# Patient Record
Sex: MALE | Race: BLACK OR AFRICAN AMERICAN | Employment: FULL TIME | URBAN - METROPOLITAN AREA
[De-identification: names, ages, dates, MRNs, and addresses within clinical notes are randomized per-mention and may not be internally consistent; named-entity substitution may affect disease eponyms.]

---

## 2017-06-01 ENCOUNTER — OFFICE VISIT (OUTPATIENT)
Dept: FAMILY MEDICINE CLINIC | Age: 32
End: 2017-06-01

## 2017-06-01 VITALS
TEMPERATURE: 98.2 F | HEIGHT: 75 IN | RESPIRATION RATE: 18 BRPM | BODY MASS INDEX: 28.8 KG/M2 | WEIGHT: 231.6 LBS | SYSTOLIC BLOOD PRESSURE: 112 MMHG | OXYGEN SATURATION: 99 % | DIASTOLIC BLOOD PRESSURE: 78 MMHG | HEART RATE: 70 BPM

## 2017-06-01 DIAGNOSIS — L21.9 SEBORRHEIC DERMATITIS: Primary | ICD-10-CM

## 2017-06-01 RX ORDER — BISMUTH SUBSALICYLATE 262 MG
1 TABLET,CHEWABLE ORAL DAILY
COMMUNITY

## 2017-06-01 RX ORDER — GLUCOSAMINE/CHONDR SU A SOD 750-600 MG
TABLET ORAL
COMMUNITY
End: 2019-02-09

## 2017-06-01 RX ORDER — CHLORPHENIRAMINE MALEATE 4 MG
TABLET ORAL 2 TIMES DAILY
Qty: 15 G | Refills: 0 | Status: SHIPPED | OUTPATIENT
Start: 2017-06-01 | End: 2019-02-09

## 2017-06-01 NOTE — PROGRESS NOTES
1. Have you been to the ER, urgent care clinic since your last visit? Hospitalized since your last visit? No    2. Have you seen or consulted any other health care providers outside of the [address] [place] [address] since your last visit? Include any pap smears or colon screening.  No     Chief Complaint   Patient presents with    Allergic Reaction     patient has two black marks on face not sure where its from     Learning Assessment 4/3/2015   PRIMARY LEARNER Patient   HIGHEST LEVEL OF EDUCATION - PRIMARY LEARNER  SOME COLLEGE   BARRIERS PRIMARY LEARNER NONE   CO-LEARNER CAREGIVER No   PRIMARY LANGUAGE ENGLISH   LEARNER PREFERENCE PRIMARY READING     DEMONSTRATION   ANSWERED BY Katey Sharma   RELATIONSHIP SELF

## 2017-06-01 NOTE — MR AVS SNAPSHOT
Visit Information Date & Time Provider Department Dept. Phone Encounter #  
 6/1/2017 12:00 PM Maxfarideh Gresham, [address] [phone] [id number] Upcoming Health Maintenance Date Due DTaP/Tdap/Td series (1 - Tdap) 3/7/2007 INFLUENZA AGE 9 TO ADULT 8/1/2017 Allergies as of 6/1/2017  Review Complete On: 6/1/2017 By: Jennifer Gresham NP No Known Allergies Current Immunizations  Never Reviewed No immunizations on file. Not reviewed this visit You Were Diagnosed With   
  
 Codes Comments Seborrheic dermatitis    -  Primary ICD-10-CM: L21.9 ICD-9-CM: 690.10 Vitals BP Pulse Temp Resp Height(growth percentile) Weight(growth percentile) 112/78 (BP 1 Location: Left arm, BP Patient Position: Sitting) 70 98.2 °F (36.8 °C) (Oral) 18 6' 3\" (1.905 m) 231 lb 9.6 oz (105.1 kg) SpO2 BMI Smoking Status 99% 28.95 kg/m2 Former Smoker Vitals History BMI and BSA Data Body Mass Index Body Surface Area  
 28.95 kg/m 2 2.36 m 2 Preferred Pharmacy Pharmacy Name Phone [id number] [place], [address] [place] [address] [phone] Your Updated Medication List  
  
   
This list is accurate as of: 6/1/17 12:19 PM.  Always use your most recent med list. ALLEGRA-D 24 HOUR 180-240 mg per tablet Generic drug:  fexofenadine-pseudoephedrine Take 1 Tab by mouth daily. Biotin 2,500 mcg Cap Take  by mouth. clotrimazole 1 % topical cream  
Commonly known as:  Hung Rashid Apply  to affected area two (2) times a day. collagenase 250 unit/gram ointment Commonly known as:  SANTYL Apply  to affected area daily. multivitamin tablet Commonly known as:  ONE A DAY Take 1 Tab by mouth daily. Prescriptions Sent to Pharmacy  Refills  
 clotrimazole (LOTRIMIN) 1 % topical cream 0  
 Sig: Apply  to affected area two (2) times a day. Class: Normal  
 [facility] [address], [address] [facility] #: [phone] Route: Topical  
  
Introducing [facility] & HEALTH SERVICES! [employer] introduces Nortal AS patient portal. Now you can access parts of your medical record, email your doctor's office, and request medication refills online. 1. In your internet browser, go to [email or web address] 2. Click on the First Time User? Click Here link in the Sign In box. You will see the New Member Sign Up page. 3. Enter your Nortal AS Access Code exactly as it appears below. You will not need to use this code after youve completed the sign-up process. If you do not sign up before the expiration date, you must request a new code. · Nortal AS Access Code: [email or web address] Expires: 8/30/2017 11:56 AM 
 
4. Enter the last four digits of your Social Security Number (xxxx) and Date of Birth (mm/dd/yyyy) as indicated and click Submit. You will be taken to the next sign-up page. 5. Create a Nortal AS ID. This will be your Nortal AS login ID and cannot be changed, so think of one that is secure and easy to remember. 6. Create a Nortal AS password. You can change your password at any time. 7. Enter your Password Reset Question and Answer. This can be used at a later time if you forget your password. 8. Enter your e-mail address. You will receive e-mail notification when new information is available in [address] 9. Click Sign Up. You can now view and download portions of your medical record. 10. Click the Download Summary menu link to download a portable copy of your medical information. If you have questions, please visit the Frequently Asked Questions section of the Nortal AS website. Remember, Nortal AS is NOT to be used for urgent needs. For medical emergencies, dial 911. Now available from your iPhone and Android! Please provide this summary of care documentation to your next provider. Your primary care clinician is listed as Nesha WATKINS. If you have any questions after today's visit, please call [phone].

## 2017-06-01 NOTE — PROGRESS NOTES
HISTORY OF PRESENT ILLNESS  Dylan Alexandra is a 33 y.o. male. HPI  C/o scaly hyperpigmented area on bilateral nasolabial folds. Sx present on and off x 4 years. Has seen derm in the past, prescribed a variety of topicals and cosmetic procedures with only temporary results. Uses dove for sensitive skin. No recent change in hygiene products. Past medical history, social history, family history and medications were reviewed and updated. Visit Vitals    /78 (BP 1 Location: Left arm, BP Patient Position: Sitting)    Pulse 70    Temp 98.2 °F (36.8 °C) (Oral)    Resp 18    Ht 6' 3\" (1.905 m)    Wt 231 lb 9.6 oz (105.1 kg)    SpO2 99%    BMI 28.95 kg/m2     Review of Systems   Constitutional: Negative. Skin: Positive for rash. Negative for itching. All other systems reviewed and are negative. Physical Exam   Constitutional: No distress. Neck: Neck supple. Cardiovascular: Normal rate, regular rhythm and normal heart sounds. Pulmonary/Chest: Effort normal and breath sounds normal.   Lymphadenopathy:     He has no cervical adenopathy. Skin:   Hyperpigmented linear patches on nasolabial folds extending to mouth. Mild scaling, no erythema. ASSESSMENT and PLAN  Caroline Baker was seen today for allergic reaction. Diagnoses and all orders for this visit:    Seborrheic dermatitis  -     clotrimazole (LOTRIMIN) 1 % topical cream; Apply  to affected area two (2) times a day. Possible seborrheic dermatitis. Trial lotrimin as directed. Follow up with derm [facility] with above.

## 2017-08-30 ENCOUNTER — OFFICE VISIT (OUTPATIENT)
Dept: FAMILY MEDICINE CLINIC | Age: 32
End: 2017-08-30

## 2017-08-30 VITALS
TEMPERATURE: 98 F | BODY MASS INDEX: 28.47 KG/M2 | OXYGEN SATURATION: 98 % | WEIGHT: 229 LBS | HEIGHT: 75 IN | HEART RATE: 60 BPM | DIASTOLIC BLOOD PRESSURE: 89 MMHG | RESPIRATION RATE: 18 BRPM | SYSTOLIC BLOOD PRESSURE: 123 MMHG

## 2017-08-30 DIAGNOSIS — L65.9 HAIR LOSS: Primary | ICD-10-CM

## 2017-08-30 DIAGNOSIS — B49 FUNGAL INFECTION: ICD-10-CM

## 2017-08-30 RX ORDER — EMOLLIENT COMBINATION NO.53
CREAM (GRAM) TOPICAL
COMMUNITY
Start: 2017-06-23 | End: 2019-02-09

## 2017-08-30 RX ORDER — TERBINAFINE HYDROCHLORIDE 250 MG/1
250 TABLET ORAL DAILY
Qty: 7 TAB | Refills: 0 | Status: SHIPPED | OUTPATIENT
Start: 2017-08-30 | End: 2019-02-09

## 2017-08-30 NOTE — PROGRESS NOTES
HISTORY OF PRESENT ILLNESS  Guru Duke is a 33 y.o. male. HPI: Patient reports his hair is thinning in spite of taking biotin. Denies dry skin, abnormal weight gain, or fatigue. He sees dermatologist Dr Placido Norton. He also reports he gets fungal infection in his penis from his girl friend. Last time took Lamisil and his infection resolved, requesting more pills today. Past Surgical History:   Procedure Laterality Date    HX CYST INCISION AND DRAINAGE Left 2008    spider bite, drained and packed    HX TONSILLECTOMY Bilateral 2010   No Known Allergies    Current Outpatient Prescriptions:     HYLATOPICPLUS crea, , Disp: , Rfl:     terbinafine HCl (LAMISIL) 250 mg tablet, Take 1 Tab by mouth daily. , Disp: 7 Tab, Rfl: 0    Biotin 2,500 mcg cap, Take  by mouth., Disp: , Rfl:     multivitamin (ONE A DAY) tablet, Take 1 Tab by mouth daily. , Disp: , Rfl:     clotrimazole (LOTRIMIN) 1 % topical cream, Apply  to affected area two (2) times a day., Disp: 15 g, Rfl: 0    fexofenadine-pseudoephedrine (ALLEGRA-D 24 HOUR) 180-240 mg per tablet, Take 1 Tab by mouth daily. , Disp: , Rfl:     collagenase (SANTYL) 250 unit/g ointment, Apply  to affected area daily. , Disp: 15 g, Rfl: 0  Review of Systems   Constitutional: Negative. Respiratory: Negative. Cardiovascular: Negative. Gastrointestinal: Negative. Blood pressure 123/89, pulse 60, temperature 98 °F (36.7 °C), temperature source Oral, resp. rate 18, height 6' 3\" (1.905 m), weight 229 lb (103.9 kg), SpO2 98 %. Physical Exam   Constitutional: No distress. HENT:   Mouth/Throat: Oropharynx is clear and moist. No oropharyngeal exudate. Neck: Normal range of motion. Neck supple. Cardiovascular: Normal rate and regular rhythm. No murmur heard. Pulmonary/Chest: Effort normal and breath sounds normal.   Abdominal: Soft. Bowel sounds are normal.   Skin:   Scalp hair thinning   Nursing note and vitals reviewed.       ASSESSMENT and PLAN    ICD-10-CM ICD-9-CM    1. Hair loss L65.9 704.00 TSH 3RD GENERATION      CBC W/O DIFF      METABOLIC PANEL, COMPREHENSIVE   2.  Fungal infection B49 117.9 terbinafine HCl (LAMISIL) 250 mg tablet   await labs  Pt was given an after visit summary which includes diagnosis, current medicines and vital and voiced understanding of treatment plan

## 2017-08-30 NOTE — PROGRESS NOTES
1. Have you been to the ER, urgent care clinic since your last visit? Hospitalized since your last visit? No    2. Have you seen or consulted any other health care providers outside of the [address] [place] [address] since your last visit? Include any pap smears or colon screening. No     Chief Complaint   Patient presents with    Hair/Scalp Problem     patient here to discuss hair regrowth     Learning assessment complete  Abuse Screening Questionnaire 8/30/2017   Do you ever feel afraid of your partner? N   Are you in a relationship with someone who physically or mentally threatens you? N   Is it safe for you to go home? Y     Fall Risk Assessment, last 12 mths 8/30/2017   Able to walk? Yes   Fall in past 12 months?  No

## 2017-08-31 LAB
ALBUMIN SERPL-MCNC: 4.5 G/DL (ref 3.5–5.5)
ALBUMIN/GLOB SERPL: 1.6 {RATIO} (ref 1.2–2.2)
ALP SERPL-CCNC: 68 IU/L (ref 39–117)
ALT SERPL-CCNC: 24 IU/L (ref 0–44)
AST SERPL-CCNC: 21 IU/L (ref 0–40)
BILIRUB SERPL-MCNC: 1.1 MG/DL (ref 0–1.2)
BUN SERPL-MCNC: 9 MG/DL (ref 6–20)
BUN/CREAT SERPL: 10 (ref 9–20)
CALCIUM SERPL-MCNC: 9.2 MG/DL (ref 8.7–10.2)
CHLORIDE SERPL-SCNC: 97 MMOL/L (ref 96–106)
CO2 SERPL-SCNC: 26 MMOL/L (ref 18–29)
CREAT SERPL-MCNC: 0.91 MG/DL (ref 0.76–1.27)
ERYTHROCYTE [DISTWIDTH] IN BLOOD BY AUTOMATED COUNT: 14.6 % (ref 12.3–15.4)
GLOBULIN SER CALC-MCNC: 2.9 G/DL (ref 1.5–4.5)
GLUCOSE SERPL-MCNC: 87 MG/DL (ref 65–99)
HCT VFR BLD AUTO: 43 % (ref 37.5–51)
HGB BLD-MCNC: 14.8 G/DL (ref 12.6–17.7)
MCH RBC QN AUTO: 29.7 PG (ref 26.6–33)
MCHC RBC AUTO-ENTMCNC: 34.4 G/DL (ref 31.5–35.7)
MCV RBC AUTO: 86 FL (ref 79–97)
PLATELET # BLD AUTO: 264 X10E3/UL (ref 150–379)
POTASSIUM SERPL-SCNC: 3.9 MMOL/L (ref 3.5–5.2)
PROT SERPL-MCNC: 7.4 G/DL (ref 6–8.5)
RBC # BLD AUTO: 4.98 X10E6/UL (ref 4.14–5.8)
SODIUM SERPL-SCNC: 140 MMOL/L (ref 134–144)
TSH SERPL DL<=0.005 MIU/L-ACNC: 2.9 UIU/ML (ref 0.45–4.5)
WBC # BLD AUTO: 4.5 X10E3/UL (ref 3.4–10.8)

## 2018-04-10 ENCOUNTER — OFFICE VISIT (OUTPATIENT)
Dept: FAMILY MEDICINE CLINIC | Age: 33
End: 2018-04-10

## 2018-04-10 VITALS
HEIGHT: 75 IN | DIASTOLIC BLOOD PRESSURE: 80 MMHG | TEMPERATURE: 98.2 F | OXYGEN SATURATION: 98 % | SYSTOLIC BLOOD PRESSURE: 110 MMHG | RESPIRATION RATE: 16 BRPM | BODY MASS INDEX: 29.12 KG/M2 | WEIGHT: 234.2 LBS | HEART RATE: 64 BPM

## 2018-04-10 DIAGNOSIS — E66.3 OVER WEIGHT: ICD-10-CM

## 2018-04-10 DIAGNOSIS — Z00.00 ROUTINE GENERAL MEDICAL EXAMINATION AT A HEALTH CARE FACILITY: Primary | ICD-10-CM

## 2018-04-10 DIAGNOSIS — B49 FUNGAL INFECTION: ICD-10-CM

## 2018-04-10 RX ORDER — NYSTATIN 100000 [USP'U]/ML
SUSPENSION ORAL
Qty: 200 ML | Refills: 0 | Status: SHIPPED | OUTPATIENT
Start: 2018-04-10 | End: 2019-02-09

## 2018-04-10 NOTE — PATIENT INSTRUCTIONS
Body Mass Index: Care Instructions  Your Care Instructions    Body mass index (BMI) can help you see if your weight is raising your risk for health problems. It uses a formula to compare how much you weigh with how tall you are. · A BMI lower than 18.5 is considered underweight. · A BMI between 18.5 and 24.9 is considered healthy. · A BMI between 25 and 29.9 is considered overweight. A BMI of 30 or higher is considered obese. If your BMI is in the normal range, it means that you have a lower risk for weight-related health problems. If your BMI is in the overweight or obese range, you may be at increased risk for weight-related health problems, such as high blood pressure, heart disease, stroke, arthritis or joint pain, and diabetes. If your BMI is in the underweight range, you may be at increased risk for health problems such as fatigue, lower protection (immunity) against illness, muscle loss, bone loss, hair loss, and hormone problems. BMI is just one measure of your risk for weight-related health problems. You may be at higher risk for health problems if you are not active, you eat an unhealthy diet, or you drink too much alcohol or use tobacco products. Follow-up care is a key part of your treatment and safety. Be sure to make and go to all appointments, and call your doctor if you are having problems. It's also a good idea to know your test results and keep a list of the medicines you take. How can you care for yourself at home? · Practice healthy eating habits. This includes eating plenty of fruits, vegetables, whole grains, lean protein, and low-fat dairy. · If your doctor recommends it, get more exercise. Walking is a good choice. Bit by bit, increase the amount you walk every day. Try for at least 30 minutes on most days of the week. · Do not smoke. Smoking can increase your risk for health problems. If you need help quitting, talk to your doctor about stop-smoking programs and medicines. These can increase your chances of quitting for good. · Limit alcohol to 2 drinks a day for men and 1 drink a day for women. Too much alcohol can cause health problems. If you have a BMI higher than 25  · Your doctor may do other tests to check your risk for weight-related health problems. This may include measuring the distance around your waist. A waist measurement of more than 40 inches in men or 35 inches in women can increase the risk of weight-related health problems. · Talk with your doctor about steps you can take to stay healthy or improve your health. You may need to make lifestyle changes to lose weight and stay healthy, such as changing your diet and getting regular exercise. If you have a BMI lower than 18.5  · Your doctor may do other tests to check your risk for health problems. · Talk with your doctor about steps you can take to stay healthy or improve your health. You may need to make lifestyle changes to gain or maintain weight and stay healthy, such as getting more healthy foods in your diet and doing exercises to build muscle. Where can you learn more? Go to http://marcial-frankie.info/. Enter S176 in the search box to learn more about \"Body Mass Index: Care Instructions. \"  Current as of: October 13, 2016  Content Version: 11.4  © 5299-4162 Healthwise, Incorporated. Care instructions adapted under license by [employer] (which disclaims liability or warranty for this information). If you have questions about a medical condition or this instruction, always ask your healthcare professional. [address] any warranty or liability for your use of this information.

## 2018-04-10 NOTE — MR AVS SNAPSHOT
[address] 
[phone] Patient: Carlota Tobar MRN: [id number] :1985 Visit Information Date & Time Provider Department Dept. Phone Encounter #  
 4/10/2018 11:45 AM Taylor Aceves, [address] [phone] [id number] Upcoming Health Maintenance Date Due DTaP/Tdap/Td series (1 - Tdap) 3/7/2007 Influenza Age 5 to Adult 2017 Allergies as of 4/10/2018  Review Complete On: 4/10/2018 By: Malcolm Howard LPN No Known Allergies Current Immunizations  Never Reviewed No immunizations on file. Not reviewed this visit You Were Diagnosed With   
  
 Codes Comments Routine general medical examination at a health care facility    -  Primary ICD-10-CM: Z00.00 ICD-9-CM: V70.0 Fungal infection     ICD-10-CM: B49 ICD-9-CM: 117.9 Vitals BP Pulse Temp Resp Height(growth percentile) Weight(growth percentile) 110/80 (BP 1 Location: Left arm, BP Patient Position: Sitting) 64 98.2 °F (36.8 °C) (Oral) 16 6' 3\" (1.905 m) 234 lb 3.2 oz (106.2 kg) SpO2 BMI Smoking Status 98% 29.27 kg/m2 Former Smoker Vitals History BMI and BSA Data Body Mass Index Body Surface Area  
 29.27 kg/m 2 2.37 m 2 Preferred Pharmacy Pharmacy Name Phone [id number] Rainelle Kehr, [address] [place] [address] [phone] Your Updated Medication List  
  
   
This list is accurate as of 4/10/18 12:01 PM.  Always use your most recent med list. ALLEGRA-D 24 HOUR 180-240 mg per tablet Generic drug:  fexofenadine-pseudoephedrine Take 1 Tab by mouth daily. Biotin 2,500 mcg Cap Take  by mouth. clotrimazole 1 % topical cream  
Commonly known as:  Pro Garber Apply  to affected area two (2) times a day. collagenase 250 unit/gram ointment Commonly known as:  SANTYL Apply  to affected area daily. HYLATOPICPLUS Crea Generic drug:  emollient combination no. 53  
  
 multivitamin tablet Commonly known as:  ONE A DAY Take 1 Tab by mouth daily. nystatin 100,000 unit/mL suspension Commonly known as:  MYCOSTATIN  
swish and spit qid  
  
 terbinafine HCl 250 mg tablet Commonly known as:  LAMISIL Take 1 Tab by mouth daily. Prescriptions Sent to Pharmacy Refills  
 nystatin (MYCOSTATIN) 100,000 unit/mL suspension 0 Sig: swish and spit qid Class: Normal  
 Pharmacy: [facility] [address] [facility], [address] [place] [address] Ph #: [phone] We Performed the Following CBC W/O DIFF [35841 CPT(R)] HEMOGLOBIN A1C WITH EAG [47224 CPT(R)] LIPID PANEL [69711 CPT(R)] METABOLIC PANEL, COMPREHENSIVE [58036 CPT(R)] TSH 3RD GENERATION [37201 CPT(R)] VITAMIN D, 25 HYDROXY [id number] CPT(R)] Patient Instructions Body Mass Index: Care Instructions Your Care Instructions Body mass index (BMI) can help you see if your weight is raising your risk for health problems. It uses a formula to compare how much you weigh with how tall you are. · A BMI lower than 18.5 is considered underweight. · A BMI between 18.5 and 24.9 is considered healthy. · A BMI between 25 and 29.9 is considered overweight. A BMI of 30 or higher is considered obese. If your BMI is in the normal range, it means that you have a lower risk for weight-related health problems. If your BMI is in the overweight or obese range, you may be at increased risk for weight-related health problems, such as high blood pressure, heart disease, stroke, arthritis or joint pain, and diabetes. If your BMI is in the underweight range, you may be at increased risk for health problems such as fatigue, lower protection (immunity) against illness, muscle loss, bone loss, hair loss, and hormone problems. BMI is just one measure of your risk for weight-related health problems. You may be at higher risk for health problems if you are not active, you eat an unhealthy diet, or you drink too much alcohol or use tobacco products. Follow-up care is a key part of your treatment and safety. Be sure to make and go to all appointments, and call your doctor if you are having problems. It's also a good idea to know your test results and keep a list of the medicines you take. How can you care for yourself at home? · Practice healthy eating habits. This includes eating plenty of fruits, vegetables, whole grains, lean protein, and low-fat dairy. · If your doctor recommends it, get more exercise. Walking is a good choice. Bit by bit, increase the amount you walk every day. Try for at least 30 minutes on most days of the week. · Do not smoke. Smoking can increase your risk for health problems. If you need help quitting, talk to your doctor about stop-smoking programs and medicines. These can increase your chances of quitting for good. · Limit alcohol to 2 drinks a day for men and 1 drink a day for women. Too much alcohol can cause health problems. If you have a BMI higher than 25 · Your doctor may do other tests to check your risk for weight-related health problems. This may include measuring the distance around your waist. A waist measurement of more than 40 inches in men or 35 inches in women can increase the risk of weight-related health problems. · Talk with your doctor about steps you can take to stay healthy or improve your health. You may need to make lifestyle changes to lose weight and stay healthy, such as changing your diet and getting regular exercise. If you have a BMI lower than 18.5 · Your doctor may do other tests to check your risk for health problems. · Talk with your doctor about steps you can take to stay healthy or improve your health.  You may need to make lifestyle changes to gain or maintain weight and stay healthy, such as getting more healthy foods in your diet and doing exercises to build muscle. Where can you learn more? Go to http://marcial-frankie.info/. Enter S176 in the search box to learn more about \"Body Mass Index: Care Instructions. \" Current as of: October 13, 2016 Content Version: 11.4 © 7350-3770 [employer]. Care instructions adapted under license by [employer] (which disclaims liability or warranty for this information). If you have questions about a medical condition or this instruction, always ask your healthcare professional. [address] any warranty or liability for your use of this information. Introducing [facility] & HEALTH SERVICES! Mel[employer] introduces [email or web address] patient portal. Now you can access parts of your medical record, email your doctor's office, and request medication refills online. 1. In your internet browser, go to [email or web address] 2. Click on the First Time User? Click Here link in the Sign In box. You will see the New Member Sign Up page. 3. Enter your [email or web address] Access Code exactly as it appears below. You will not need to use this code after youve completed the sign-up process. If you do not sign up before the expiration date, you must request a new code. · [email or web address] Access Code: [email or web address] Expires: 7/9/2018 11:20 AM 
 
4. Enter the last four digits of your Social Security Number (xxxx) and Date of Birth (mm/dd/yyyy) as indicated and click Submit. You will be taken to the next sign-up page. 5. Create a embraaset ID. This will be your [email or web address] login ID and cannot be changed, so think of one that is secure and easy to remember. 6. Create a [email or web address] password. You can change your password at any time. 7. Enter your Password Reset Question and Answer. This can be used at a later time if you forget your password. 8. Enter your e-mail address. You will receive e-mail notification when new information is available in [address] 9. Click Sign Up. You can now view and download portions of your medical record. 10. Click the Download Summary menu link to download a portable copy of your medical information. If you have questions, please visit the Frequently Asked Questions section of the Vlingo website. Remember, Vlingo is NOT to be used for urgent needs. For medical emergencies, dial 911. Now available from your iPhone and Android! Please provide this summary of care documentation to your next provider. Your primary care clinician is listed as [facility] JUAN ANTONIOCHELSEA. If you have any questions after today's visit, please call [phone].

## 2018-04-10 NOTE — PROGRESS NOTES
Chief Complaint   Patient presents with    Mouth Laceration     Pt per noticed in both checks some white patches about 2 days ago. 1. Have you been to the ER, urgent care clinic since your last visit? Hospitalized since your last visit? No    2. Have you seen or consulted any other health care providers outside of the [facility] since your last visit? Include any pap smears or colon screening.  No

## 2018-04-10 NOTE — PROGRESS NOTES
Subjective: Arlene Mullen is a 29 y.o. male presenting for his annual checkup. ROS:  Feeling well. No dyspnea or chest pain on exertion. No abdominal pain, change in bowel habits, black or bloody stools. No urinary tract or prostatic symptoms. No neurological complaints. Specific concerns today: Complaints of white patches and dry mouth inside his mouth. Over weight, he doesn't exercise but is active during the days. He watches his diet    There are no active problems to display for this patient. Current Outpatient Prescriptions   Medication Sig Dispense Refill    nystatin (MYCOSTATIN) 100,000 unit/mL suspension swish and spit qid 200 mL 0    HYLATOPICPLUS crea       Biotin 2,500 mcg cap Take  by mouth.  multivitamin (ONE A DAY) tablet Take 1 Tab by mouth daily.  terbinafine HCl (LAMISIL) 250 mg tablet Take 1 Tab by mouth daily. 7 Tab 0    clotrimazole (LOTRIMIN) 1 % topical cream Apply  to affected area two (2) times a day. 15 g 0    fexofenadine-pseudoephedrine (ALLEGRA-D 24 HOUR) 180-240 mg per tablet Take 1 Tab by mouth daily.  collagenase (SANTYL) 250 unit/g ointment Apply  to affected area daily. 15 g 0     No Known Allergies  History reviewed. No pertinent past medical history.   Past Surgical History:   Procedure Laterality Date    HX CYST INCISION AND DRAINAGE Left 2008    spider bite, drained and packed    HX TONSILLECTOMY Bilateral 2010     Family History   Problem Relation Age of Onset    Diabetes Mother     Asthma Mother     No Known Problems Father      Social History   Substance Use Topics    Smoking status: Former Smoker     Quit date: 10/23/2008    Smokeless tobacco: Never Used    Alcohol use Yes          Objective:     Visit Vitals    /80 (BP 1 Location: Left arm, BP Patient Position: Sitting)    Pulse 64    Temp 98.2 °F (36.8 °C) (Oral)    Resp 16    Ht 6' 3\" (1.905 m)    Wt 234 lb 3.2 oz (106.2 kg)    SpO2 98%    BMI 29.27 kg/m2     The patient appears well, alert, oriented x 3, in no distress. ENT white patches in side his mouth, otherwise normal.  Neck supple. No adenopathy or thyromegaly. RIAN. Lungs are clear, good air entry, no wheezes, rhonchi or rales. S1 and S2 normal, no murmurs, regular rate and rhythm. Abdomen is soft without tenderness, guarding, mass or organomegaly.  exam: no penile lesions or discharge, no testicular masses or tenderness, no hernias. Extremities show no edema, normal peripheral pulses. Neurological is normal without focal findings. Assessment/Plan:   healthy adult male  lose weight, increase physical activity, follow low fat diet, follow low salt diet, routine labs ordered. ICD-10-CM ICD-9-CM    1. Routine general medical examination at a health care facility Z00.00 V70.0 CBC W/O DIFF      METABOLIC PANEL, COMPREHENSIVE      LIPID PANEL      TSH 3RD GENERATION      VITAMIN D, 25 HYDROXY      HEMOGLOBIN A1C WITH EAG   2. Fungal infection B49 117.9 nystatin (MYCOSTATIN) 100,000 unit/mL suspension   3.  Over weight E66.3 278.02 Normal BMI discussed, advised to watch diet and exericse   Pt was given an after visit summary which includes diagnosis, current medicines and vital and voiced understanding of treatment plan

## 2018-04-11 ENCOUNTER — TELEPHONE (OUTPATIENT)
Dept: FAMILY MEDICINE CLINIC | Age: 33
End: 2018-04-11

## 2018-04-11 DIAGNOSIS — R17 ELEVATED BILIRUBIN: Primary | ICD-10-CM

## 2018-04-11 LAB
25(OH)D3+25(OH)D2 SERPL-MCNC: 28.1 NG/ML (ref 30–100)
ALBUMIN SERPL-MCNC: 4.7 G/DL (ref 3.5–5.5)
ALBUMIN/GLOB SERPL: 1.7 {RATIO} (ref 1.2–2.2)
ALP SERPL-CCNC: 64 IU/L (ref 39–117)
ALT SERPL-CCNC: 24 IU/L (ref 0–44)
AST SERPL-CCNC: 31 IU/L (ref 0–40)
BILIRUB SERPL-MCNC: 1.3 MG/DL (ref 0–1.2)
BUN SERPL-MCNC: 11 MG/DL (ref 6–20)
BUN/CREAT SERPL: 11 (ref 9–20)
CALCIUM SERPL-MCNC: 9.4 MG/DL (ref 8.7–10.2)
CHLORIDE SERPL-SCNC: 101 MMOL/L (ref 96–106)
CHOLEST SERPL-MCNC: 152 MG/DL (ref 100–199)
CO2 SERPL-SCNC: 29 MMOL/L (ref 18–29)
CREAT SERPL-MCNC: 1.02 MG/DL (ref 0.76–1.27)
ERYTHROCYTE [DISTWIDTH] IN BLOOD BY AUTOMATED COUNT: 14.3 % (ref 12.3–15.4)
EST. AVERAGE GLUCOSE BLD GHB EST-MCNC: 105 MG/DL
GFR SERPLBLD CREATININE-BSD FMLA CKD-EPI: 112 ML/MIN/1.73
GFR SERPLBLD CREATININE-BSD FMLA CKD-EPI: 97 ML/MIN/1.73
GLOBULIN SER CALC-MCNC: 2.8 G/DL (ref 1.5–4.5)
GLUCOSE SERPL-MCNC: 85 MG/DL (ref 65–99)
HBA1C MFR BLD: 5.3 % (ref 4.8–5.6)
HCT VFR BLD AUTO: 43.2 % (ref 37.5–51)
HDLC SERPL-MCNC: 49 MG/DL
HGB BLD-MCNC: 14.8 G/DL (ref 13–17.7)
INTERPRETATION, 910389: NORMAL
LDLC SERPL CALC-MCNC: 91 MG/DL (ref 0–99)
MCH RBC QN AUTO: 29.6 PG (ref 26.6–33)
MCHC RBC AUTO-ENTMCNC: 34.3 G/DL (ref 31.5–35.7)
MCV RBC AUTO: 86 FL (ref 79–97)
PLATELET # BLD AUTO: 263 X10E3/UL (ref 150–379)
POTASSIUM SERPL-SCNC: 4.2 MMOL/L (ref 3.5–5.2)
PROT SERPL-MCNC: 7.5 G/DL (ref 6–8.5)
RBC # BLD AUTO: 5 X10E6/UL (ref 4.14–5.8)
SODIUM SERPL-SCNC: 141 MMOL/L (ref 134–144)
TRIGL SERPL-MCNC: 59 MG/DL (ref 0–149)
TSH SERPL DL<=0.005 MIU/L-ACNC: 2.32 UIU/ML (ref 0.45–4.5)
VLDLC SERPL CALC-MCNC: 12 MG/DL (ref 5–40)
WBC # BLD AUTO: 4.7 X10E3/UL (ref 3.4–10.8)

## 2018-04-11 NOTE — TELEPHONE ENCOUNTER
Pt has been made aware of his Bilirubin levels being elevated, and to follow up for recheck at the lab(only) . He voiced understanding and a letter has also been mailed to pt.

## 2018-04-21 LAB
BILIRUB DIRECT SERPL-MCNC: 0.32 MG/DL (ref 0–0.4)
BILIRUB INDIRECT SERPL-MCNC: 1.18 MG/DL (ref 0.1–0.8)
BILIRUB SERPL-MCNC: 1.5 MG/DL (ref 0–1.2)

## 2018-11-07 ENCOUNTER — OFFICE VISIT (OUTPATIENT)
Dept: FAMILY MEDICINE CLINIC | Age: 33
End: 2018-11-07

## 2018-11-07 VITALS
HEIGHT: 75 IN | HEART RATE: 77 BPM | WEIGHT: 231.4 LBS | DIASTOLIC BLOOD PRESSURE: 69 MMHG | TEMPERATURE: 98 F | OXYGEN SATURATION: 98 % | SYSTOLIC BLOOD PRESSURE: 115 MMHG | BODY MASS INDEX: 28.77 KG/M2

## 2018-11-07 DIAGNOSIS — N50.82 SCROTAL PAIN: Primary | ICD-10-CM

## 2018-11-07 DIAGNOSIS — R35.0 URINARY FREQUENCY: ICD-10-CM

## 2018-11-07 RX ORDER — CIPROFLOXACIN 500 MG/1
500 TABLET ORAL 2 TIMES DAILY
Qty: 20 TAB | Refills: 0 | Status: SHIPPED | OUTPATIENT
Start: 2018-11-07 | End: 2018-11-17

## 2018-11-07 NOTE — LETTER
11/12/2018 7:17 AM 
 
 [facility] 
[address]uno [address] [id number] Dear [facility]: Please find your most recent results below. Resulted Orders CHLAMYDIA/GC PCR Result Value Ref Range Chlamydia trachomatis, SHAKIR Negative Negative Neisseria gonorrhoeae, SHAKIR Negative Negative Narrative Performed at:  [address]  [id number] : Ja Brown MD, Phone:  [phone] GC/chlamydia is negative RECOMMENDATIONS: 
Please take medication, let me know if you still have pain Please call me if you have any questions: [phone] Sincerely, Alejandra Martinez NP

## 2018-11-07 NOTE — PROGRESS NOTES
Chief Complaint Patient presents with  Groin Pain  
  over week scrotum 6-8 out of 10  
 
1. Have you been to the ER, urgent care clinic since your last visit? Hospitalized since your last visit? No 
 
2. Have you seen or consulted any other health care providers outside of the [facility] since your last visit? Include any pap smears or colon screening.  No

## 2018-11-07 NOTE — PROGRESS NOTES
HISTORY OF PRESENT ILLNESS Jessica Rosado is a 29 y.o. male. HPI: Patient complaints of scrotal pain x 2 weeks. He had 6-8 out of ten in the last weeks. It is associated with urinary frequency and his semen  is thicker than normal. He reports that he has broken off with his girl friend a month ago and hasn't had any sex since then . Denies penile discharge History reviewed. No pertinent past medical history. Past Surgical History:  
Procedure Laterality Date  HX CYST INCISION AND DRAINAGE Left 2008  
 spider bite, drained and packed  HX TONSILLECTOMY Bilateral 2010 No Known Allergies Current Outpatient Medications:  
  ciprofloxacin HCl (CIPRO) 500 mg tablet, Take 1 Tab by mouth two (2) times a day for 10 days. , Disp: 20 Tab, Rfl: 0 
  HYLATOPICPLUS crea, , Disp: , Rfl:   Biotin 2,500 mcg cap, Take  by mouth., Disp: , Rfl:  
  multivitamin (ONE A DAY) tablet, Take 1 Tab by mouth daily. , Disp: , Rfl:  
  fexofenadine-pseudoephedrine (ALLEGRA-D 24 HOUR) 180-240 mg per tablet, Take 1 Tab by mouth daily. , Disp: , Rfl:  
  nystatin (MYCOSTATIN) 100,000 unit/mL suspension, swish and spit qid, Disp: 200 mL, Rfl: 0 
  terbinafine HCl (LAMISIL) 250 mg tablet, Take 1 Tab by mouth daily. , Disp: 7 Tab, Rfl: 0 
  clotrimazole (LOTRIMIN) 1 % topical cream, Apply  to affected area two (2) times a day., Disp: 15 g, Rfl: 0 
  collagenase (SANTYL) 250 unit/g ointment, Apply  to affected area daily. , Disp: 15 g, Rfl: 0 Review of Systems Constitutional: Negative. Eyes: Negative. Respiratory: Negative. Cardiovascular: Negative. Gastrointestinal: Negative. Genitourinary: Positive for frequency. Blood pressure 115/69, pulse 77, temperature 98 °F (36.7 °C), temperature source Oral, height 6' 3\" (1.905 m), weight 231 lb 6.4 oz (105 kg), SpO2 98 %. Physical Exam  
Constitutional: No distress. HENT:  
Mouth/Throat: Oropharynx is clear and moist.  
Neck: Neck supple. Cardiovascular: Normal rate and regular rhythm. Pulmonary/Chest: Effort normal and breath sounds normal.  
Abdominal: Bowel sounds are normal.  
Genitourinary: No penile tenderness. Genitourinary Comments: No lump noted in scrotum No penile discharge UA is clear for infection Nursing note and vitals reviewed. ASSESSMENT and PLAN Diagnoses and all orders for this visit: 1. Scrotal pain 
-     URINALYSIS W/ RFLX MICROSCOPIC 
-     US SCROTUM/TESTICLES; Future 
-     ciprofloxacin HCl (CIPRO) 500 mg tablet; Take 1 Tab by mouth two (2) times a day for 10 days. -     CHLAMYDIA/GC AMPLIFICATON THINPREP 2. Urinary frequency Pt was given an after visit summary which includes diagnosis, current medicines and vital and voiced understanding of treatment plan

## 2018-11-09 ENCOUNTER — TELEPHONE (OUTPATIENT)
Dept: FAMILY MEDICINE CLINIC | Age: 33
End: 2018-11-09

## 2018-11-09 NOTE — TELEPHONE ENCOUNTER
Pt is requesting a call back from office in regards to lab results. Pt states that a voice mail maybe left if no answer.      LOV Wednesday, November 07, 2018 10:30 AM    Best call back [phone]

## 2018-11-11 LAB
C TRACH RRNA SPEC QL NAA+PROBE: NEGATIVE
N GONORRHOEA RRNA SPEC QL NAA+PROBE: NEGATIVE

## 2018-11-19 ENCOUNTER — HOSPITAL ENCOUNTER (OUTPATIENT)
Dept: ULTRASOUND IMAGING | Age: 33
Discharge: HOME | End: 2018-11-19
Payer: COMMERCIAL

## 2018-11-19 DIAGNOSIS — N50.82 SCROTAL PAIN: ICD-10-CM

## 2018-11-19 PROCEDURE — 76870 US EXAM SCROTUM: CPT

## 2018-11-20 ENCOUNTER — TELEPHONE (OUTPATIENT)
Dept: FAMILY MEDICINE CLINIC | Age: 33
End: 2018-11-20

## 2018-11-20 DIAGNOSIS — I86.1 LEFT VARICOCELE: Primary | ICD-10-CM

## 2019-02-09 ENCOUNTER — OFFICE VISIT (OUTPATIENT)
Dept: URGENT CARE | Age: 34
End: 2019-02-09

## 2019-02-09 VITALS
BODY MASS INDEX: 29.72 KG/M2 | DIASTOLIC BLOOD PRESSURE: 92 MMHG | SYSTOLIC BLOOD PRESSURE: 139 MMHG | OXYGEN SATURATION: 100 % | RESPIRATION RATE: 16 BRPM | HEART RATE: 62 BPM | HEIGHT: 75 IN | TEMPERATURE: 98.6 F | WEIGHT: 239 LBS

## 2019-02-09 DIAGNOSIS — H01.001 BLEPHARITIS OF RIGHT UPPER EYELID, UNSPECIFIED TYPE: Primary | ICD-10-CM

## 2019-02-09 RX ORDER — ERYTHROMYCIN 5 MG/G
1 OINTMENT OPHTHALMIC
Qty: 10 TUBE | Refills: 0 | Status: SHIPPED | OUTPATIENT
Start: 2019-02-09 | End: 2019-02-19

## 2019-02-09 RX ORDER — ERYTHROMYCIN 5 MG/G
1 OINTMENT OPHTHALMIC
Qty: 10 TUBE | Refills: 0 | Status: SHIPPED | OUTPATIENT
Start: 2019-02-09 | End: 2019-02-09 | Stop reason: SDUPTHER

## 2019-02-10 NOTE — PROGRESS NOTES
Patient with eye irritation for 5 days and with lid swelling in the right upper lid starting yesterday. No foreign bodies or trauma. Exposure to mendoza air at work. The history is provided by the patient. Eye Pain   This is a new problem. The current episode started more than 2 days ago. The problem has been gradually worsening. Nothing relieves the symptoms. Treatments tried: Visine allergy drops, oral allergy medications. The treatment provided mild relief. History reviewed. No pertinent past medical history. Past Surgical History:   Procedure Laterality Date    HX CYST INCISION AND DRAINAGE Left 2008    spider bite, drained and packed    HX TONSILLECTOMY Bilateral 2010         Family History   Problem Relation Age of Onset    Diabetes Mother     Asthma Mother     No Known Problems Father         Social History     Socioeconomic History    Marital status: SINGLE     Spouse name: Not on file    Number of children: Not on file    Years of education: Not on file    Highest education level: Not on file   Social Needs    Financial resource strain: Not on file    Food insecurity - worry: Not on file    Food insecurity - inability: Not on file   [place] needs - medical: Not on file   [place] needs - non-medical: Not on file   Occupational History    Not on file   Tobacco Use    Smoking status: Former Smoker     Last attempt to quit: 10/23/2008     Years since quitting: 10.3    Smokeless tobacco: Never Used   Substance and Sexual Activity    Alcohol use: Yes    Drug use: No    Sexual activity: Yes   Other Topics Concern    Not on file   Social History Narrative    Not on file                ALLERGIES: Patient has no known allergies. Review of Systems   Eyes: Positive for photophobia. Negative for discharge, itching and visual disturbance.         Eye irritation, lid swelling        Vitals:    02/09/19 1858   BP: (!) 139/92   Pulse: 62   Resp: 16   Temp: 98.6 °F (37 °C)   SpO2: 100%   Weight: 239 lb (108.4 kg)   Height: 6' 3\" (1.905 m)       Physical Exam   Constitutional: Vital signs are normal. He appears well-developed and well-nourished. No distress. Eyes: Conjunctivae and EOM are normal. Pupils are equal, round, and reactive to light. Right eye exhibits no discharge and no exudate. Left eye exhibits no discharge and no exudate. MDM    ICD-10-CM ICD-9-CM    1. Blepharitis of right upper eyelid, unspecified type H01.001 373.00 erythromycin (ILOTYCIN) ophthalmic ointment     Medications Ordered Today   Medications    DISCONTD: erythromycin (ILOTYCIN) ophthalmic ointment     Sig: Administer 1 g to right eye nightly for 10 days. Apply thin ribbon to the lower eyelid before bed. Dispense:  10 Tube     Refill:  0    erythromycin (ILOTYCIN) ophthalmic ointment     Sig: Administer 1 g to right eye nightly for 10 days. Apply thin ribbon to the lower eyelid before bed. Dispense:  10 Tube     Refill:  0     No results found for any visits on 02/09/19. The patients condition was discussed with the patient and they understand. The patient is to follow up with primary care doctor. If signs and symptoms become worse the pt is to go to the ER. The patient is to take medications as prescribed.      Procedures

## 2019-02-10 NOTE — PATIENT INSTRUCTIONS
Blepharitis: Care Instructions  Your Care Instructions    Blepharitis is an inflammation or infection of the eyelids. It causes dry, scaly crusts on the eyelids. It can also cause your eyes to itch, burn, and look red. This problem is more common in people who have rosacea, dandruff, skin allergies, or eczema. Home treatment can help you keep your eyes comfortable. Your doctor may also prescribe an ointment to put on your eyelids. Follow-up care is a key part of your treatment and safety. Be sure to make and go to all appointments, and call your doctor if you are having problems. It's also a good idea to know your test results and keep a list of the medicines you take. How can you care for yourself at home? · Wash your eyelids and eyebrows daily with baby shampoo. To wash your eyelids:  ? Place a very warm washcloth over your eyes for about a minute. This will help soften and loosen the crusts on your eyelashes. ? Put a few drops of baby shampoo on a warm washcloth. ? Gently wipe your eyelids. This helps remove any crust. It also cleans your eyelids. ? Rinse well with water. · Be safe with medicines. If your doctor prescribed medicine for you, use it exactly as directed. Call your doctor if you think you are having a problem with your medicine. When should you call for help? Call your doctor now or seek immediate medical care if:    · You have signs of an eye infection, such as:  ? Pus or thick discharge coming from the eye.  ? Redness or swelling around the eye.  ? A fever.    Watch closely for changes in your health, and be sure to contact your doctor if:    · You have vision changes.     · You do not get better as expected. Where can you learn more? Go to http://marcial-frankie.info/. Enter X199 in the search box to learn more about \"Blepharitis: Care Instructions. \"  Current as of: July 17, 2018  Content Version: 11.9  © 8836-8800 [employer].  Care instructions adapted under license by [employer] (which disclaims liability or warranty for this information). If you have questions about a medical condition or this instruction, always ask your healthcare professional. [address] any warranty or liability for your use of this information.

## 2019-02-11 ENCOUNTER — OFFICE VISIT (OUTPATIENT)
Dept: FAMILY MEDICINE CLINIC | Age: 34
End: 2019-02-11

## 2019-02-11 VITALS
DIASTOLIC BLOOD PRESSURE: 89 MMHG | HEIGHT: 75 IN | WEIGHT: 238 LBS | BODY MASS INDEX: 29.59 KG/M2 | HEART RATE: 66 BPM | OXYGEN SATURATION: 99 % | SYSTOLIC BLOOD PRESSURE: 133 MMHG | TEMPERATURE: 98.5 F | RESPIRATION RATE: 16 BRPM

## 2019-02-11 DIAGNOSIS — L08.9 SOFT TISSUE INFECTION: Primary | ICD-10-CM

## 2019-02-11 DIAGNOSIS — H57.11 ACUTE RIGHT EYE PAIN: ICD-10-CM

## 2019-02-11 RX ORDER — CEPHALEXIN 500 MG/1
500 CAPSULE ORAL 4 TIMES DAILY
Qty: 40 CAP | Refills: 0 | Status: SHIPPED | OUTPATIENT
Start: 2019-02-11 | End: 2019-02-21

## 2019-02-11 NOTE — PROGRESS NOTES
HISTORY OF PRESENT ILLNESS  Marielle Mchugh is a 29 y.o. male. HPI  Follow up right eye symptoms. Seen at  2 days ago with swelling of upper eyelid, eye pain and photophobia. Also had small open lesion below eyebrow. Diagnosed with blepharitis and given erythromycin ointment. Has been using as directed. Now has noticed swelling of right side of face pre auricular region with tenderness. No fever/chills malaise. States eye pain has slightly improved. Denies any recent injury or unusual exposures. Past medical history, social history, family history and medications were reviewed and updated. Blood pressure 133/89, pulse 66, temperature 98.5 °F (36.9 °C), temperature source Oral, resp. rate 16, height 6' 3\" (1.905 m), weight 238 lb (108 kg), SpO2 99 %. Review of Systems   Constitutional: Negative for chills, fever and malaise/fatigue. HENT: Negative for ear pain, sinus pain and sore throat. Eyes: Positive for photophobia and pain (improved). Negative for blurred vision, double vision, discharge and redness. Respiratory: Negative. Cardiovascular: Negative. Neurological: Negative for dizziness and headaches. All other systems reviewed and are negative. Physical Exam   Constitutional: No distress. HENT:   Right Ear: Tympanic membrane and ear canal normal.   Left Ear: Tympanic membrane and ear canal normal.   Nose: Right sinus exhibits no maxillary sinus tenderness and no frontal sinus tenderness. Left sinus exhibits no maxillary sinus tenderness and no frontal sinus tenderness. Mouth/Throat: Oropharynx is clear and moist. No oral lesions. No dental abscesses. Eyes: EOM are normal. Pupils are equal, round, and reactive to light. Right eye exhibits no discharge. Left eye exhibits no discharge. Right conjunctiva is injected (mild). Mild swelling of upper and lower eyelids. No erythema. Open puncture type lesion below right eyebrow. No drainage noted. Mild erythema.   Right cheek with 1+ edema. Tender pre auricular lymph node on right. Mild warmth. Limited funduscopic with no acute abnormality. Cardiovascular: Normal rate, regular rhythm and normal heart sounds. Pulmonary/Chest: Effort normal and breath sounds normal.   Skin: Skin is warm and dry. ASSESSMENT and PLAN  Diagnoses and all orders for this visit:    1. Soft tissue infection  -     cephALEXin (KEFLEX) 500 mg capsule; Take 1 Cap by mouth four (4) times daily for 10 days. Possible orbital infection originating from lesion. Stop ointment. Begin keflex as directed. Medication risks and side effects were reviewed. 2. Acute right eye pain  -     REFERRAL TO OPHTHALMOLOGY  Concerning for eye pain and photophobia. Referred to [facility] for further evaluation of possible inflammatory iritis. Contact info given to patient to schedule appointment. Follow up here prn.

## 2019-02-11 NOTE — PROGRESS NOTES
Identified pt with two pt identifiers(name and ). Reviewed record in preparation for visit and have obtained necessary documentation. Chief Complaint   Patient presents with    Facial Swelling     right side of cheek/face for 1 day, painful only when touched        Health Maintenance Due   Topic    DTaP/Tdap/Td series (1 - Tdap)       Coordination of Care Questionnaire:  :   1) Have you been to an emergency room, urgent care, or hospitalized since your last visit? yes   If yes, where when, and reason for visit? [facility] for right eye swelling      2. Have seen or consulted any other health care provider since your last visit? NO  If yes, where when, and reason for visit? 3) Do you have an Advanced Directive/ Living Will in place? NO  If yes, do we have a copy on file NO  If no, would you like information YES    Patient is accompanied by self I have received verbal consent from Beryle Glad to discuss any/all medical information while they are present in the room.     Visit Vitals  /89 (BP 1 Location: Right arm, BP Patient Position: Sitting)   Pulse 66   Temp 98.5 °F (36.9 °C) (Oral)   Resp 16   Ht 6' 3\" (1.905 m)   Wt 238 lb (108 kg)   SpO2 99%   BMI 29.75 kg/m²     Mariela Armijo LPN

## 2019-07-31 ENCOUNTER — OFFICE VISIT (OUTPATIENT)
Dept: FAMILY MEDICINE CLINIC | Age: 34
End: 2019-07-31

## 2019-07-31 VITALS
DIASTOLIC BLOOD PRESSURE: 78 MMHG | RESPIRATION RATE: 16 BRPM | SYSTOLIC BLOOD PRESSURE: 115 MMHG | TEMPERATURE: 98.2 F | HEART RATE: 67 BPM | HEIGHT: 75 IN | BODY MASS INDEX: 29 KG/M2 | WEIGHT: 233.2 LBS | OXYGEN SATURATION: 98 %

## 2019-07-31 DIAGNOSIS — R36.9 PENILE DISCHARGE: ICD-10-CM

## 2019-07-31 DIAGNOSIS — Z20.2 EXPOSURE TO STD: Primary | ICD-10-CM

## 2019-07-31 RX ORDER — CIPROFLOXACIN 500 MG/1
500 TABLET ORAL 2 TIMES DAILY
Qty: 20 TAB | Refills: 0 | Status: SHIPPED | OUTPATIENT
Start: 2019-07-31 | End: 2019-08-10

## 2019-07-31 RX ORDER — GLUCOSAMINE/CHONDR SU A SOD 750-600 MG
5000 TABLET ORAL DAILY
COMMUNITY

## 2019-07-31 RX ORDER — ASCORBIC ACID 500 MG
TABLET ORAL
COMMUNITY

## 2019-07-31 NOTE — PROGRESS NOTES
HISTORY OF PRESENT ILLNESS  Kristie Jiménez is a 36 y.o. male. HPI: Patient reports he has anew partner and requesting to check for STDs. He states penile discharge. Denies pain, burning   Past Surgical History:   Procedure Laterality Date    HX CYST INCISION AND DRAINAGE Left 2008    spider bite, drained and packed    HX TONSILLECTOMY Bilateral 2010   No Known Allergies    Current Outpatient Medications:     ascorbic acid, vitamin C, (VITAMIN C) 500 mg tablet, Take  by mouth., Disp: , Rfl:     Biotin 2,500 mcg cap, Take 5,000 mg by mouth daily. , Disp: , Rfl:     ciprofloxacin HCl (CIPRO) 500 mg tablet, Take 1 Tab by mouth two (2) times a day for 10 days. , Disp: 20 Tab, Rfl: 0    ergocalciferol, vitamin D2, (VITAMIN D2 PO), Take  by mouth., Disp: , Rfl:     multivitamin (ONE A DAY) tablet, Take 1 Tab by mouth daily. , Disp: , Rfl:     Review of Systems   Constitutional: Negative. Respiratory: Negative. Cardiovascular: Negative. Gastrointestinal: Negative. Blood pressure 115/78, pulse 67, temperature 98.2 °F (36.8 °C), temperature source Oral, resp. rate 16, height 6' 3\" (1.905 m), weight 233 lb 3.2 oz (105.8 kg), SpO2 98 %. Physical Exam   Constitutional: No distress. HENT:   Mouth/Throat: Oropharynx is clear and moist.   Neck: Normal range of motion. Neck supple. Cardiovascular: Normal rate and regular rhythm. No murmur heard. Pulmonary/Chest: Effort normal and breath sounds normal.   Abdominal: Soft. Bowel sounds are normal.   Genitourinary: Penile tenderness present. Nursing note and vitals reviewed. ASSESSMENT and PLAN  Diagnoses and all orders for this visit:    1. Exposure to STD  -     RPR  -     HEPATITIS PANEL, ACUTE  -     HIV 1/2 AG/AB, 4TH GENERATION,W RFLX CONFIRM  -     HSV 1/2 AB, IGM  -     HSV TYPE 2-SPECIFIC ABS, IGG W/REFL SUPPLEMENTAL TESTING  -     HSV-1 AB, IGG GLYCOPROTEIN, G-SPECIFIC  -     CHLAMYDIA/GC PCR    2.  Penile discharge  -     ciprofloxacin HCl (CIPRO) 500 mg tablet; Take 1 Tab by mouth two (2) times a day for 10 days.   Pt was given an after visit summary which includes diagnosis, current medicines and vital and voiced understanding of treatment plan

## 2019-07-31 NOTE — PROGRESS NOTES
Chief Complaint   Patient presents with    Exposure to STD     Change sex partner. 1. Have you been to the ER, urgent care clinic since your last visit? Hospitalized since your last visit? No    2. Have you seen or consulted any other health care providers outside of the [address] [place] [address] since your last visit? Include any pap smears or colon screening.  No

## 2019-08-01 ENCOUNTER — TELEPHONE (OUTPATIENT)
Dept: FAMILY MEDICINE CLINIC | Age: 34
End: 2019-08-01

## 2019-08-01 NOTE — TELEPHONE ENCOUNTER
----- Message from Beto Wong sent at 8/1/2019  3:04 PM EDT -----  Regarding: Reid Naik/ Telephone   Contact: [phone]  Pt requesting a return call regarding lab test results from 7/31/19 visit.

## 2019-08-03 LAB
C TRACH RRNA SPEC QL NAA+PROBE: NEGATIVE
HAV IGM SERPL QL IA: NEGATIVE
HBV CORE IGM SERPL QL IA: NEGATIVE
HBV SURFACE AG SERPL QL IA: NEGATIVE
HCV AB S/CO SERPL IA: <0.1 S/CO RATIO (ref 0–0.9)
HIV 1+2 AB+HIV1 P24 AG SERPL QL IA: NON REACTIVE
HSV1 IGG SER IA-ACNC: 36.1 INDEX (ref 0–0.9)
HSV1+2 IGM SER IA-ACNC: <0.91 RATIO (ref 0–0.9)
HSV2 IGG SER IA-ACNC: <0.91 INDEX (ref 0–0.9)
N GONORRHOEA RRNA SPEC QL NAA+PROBE: NEGATIVE
RPR SER QL: NON REACTIVE

## 2019-08-07 ENCOUNTER — TELEPHONE (OUTPATIENT)
Dept: FAMILY MEDICINE CLINIC | Age: 34
End: 2019-08-07

## 2019-08-07 RX ORDER — DOXYCYCLINE 100 MG/1
100 TABLET ORAL 2 TIMES DAILY
Qty: 20 TAB | Refills: 0 | Status: SHIPPED | OUTPATIENT
Start: 2019-08-07 | End: 2019-08-17

## 2019-08-07 NOTE — TELEPHONE ENCOUNTER
Homero Pringle, HAYLEE Cruz LPN   Caller: Unspecified (Today, 10:19 AM)             Doxycycline called in

## 2019-08-07 NOTE — TELEPHONE ENCOUNTER
Patient informed of results. Still with discharge and pain on left side, had a variocele in the past and had seen urologist before.  Still on cipro but wants to know what to do next

## 2019-12-16 ENCOUNTER — HOSPITAL ENCOUNTER (EMERGENCY)
Age: 34
Discharge: HOME | End: 2019-12-16
Attending: EMERGENCY MEDICINE
Payer: COMMERCIAL

## 2019-12-16 VITALS
OXYGEN SATURATION: 97 % | SYSTOLIC BLOOD PRESSURE: 147 MMHG | HEIGHT: 75 IN | RESPIRATION RATE: 18 BRPM | HEART RATE: 70 BPM | DIASTOLIC BLOOD PRESSURE: 99 MMHG | BODY MASS INDEX: 28.86 KG/M2 | WEIGHT: 232.14 LBS | TEMPERATURE: 97.9 F

## 2019-12-16 DIAGNOSIS — J20.9 ACUTE BRONCHITIS, UNSPECIFIED ORGANISM: Primary | ICD-10-CM

## 2019-12-16 PROCEDURE — 99282 EMERGENCY DEPT VISIT SF MDM: CPT

## 2019-12-16 PROCEDURE — 74011250637 HC RX REV CODE- 250/637: Performed by: EMERGENCY MEDICINE

## 2019-12-16 PROCEDURE — 94640 AIRWAY INHALATION TREATMENT: CPT

## 2019-12-16 RX ORDER — ALBUTEROL SULFATE 90 UG/1
2 AEROSOL, METERED RESPIRATORY (INHALATION)
Status: DISCONTINUED | OUTPATIENT
Start: 2019-12-16 | End: 2019-12-16 | Stop reason: HOSPADM

## 2019-12-16 RX ORDER — LORATADINE 10 MG/1
10 TABLET ORAL DAILY
Qty: 30 TAB | Refills: 0 | Status: SHIPPED | OUTPATIENT
Start: 2019-12-16

## 2019-12-16 RX ADMIN — ALBUTEROL SULFATE 2 PUFF: 90 AEROSOL, METERED RESPIRATORY (INHALATION) at 01:38

## 2019-12-16 NOTE — ED PROVIDER NOTES
The history is provided by the patient. Cough   This is a new problem. The current episode started 2 days ago. The problem occurs constantly. The problem has been gradually worsening. The cough is productive of sputum. There has been no fever. Associated symptoms include sore throat and vomiting (twice from coughing). Pertinent negatives include no chest pain and no shortness of breath. Treatments tried: dayquil. The treatment provided no relief. He is not a smoker. His past medical history is significant for bronchitis. His past medical history does not include pneumonia, COPD or asthma. History reviewed. No pertinent past medical history. Past Surgical History:   Procedure Laterality Date    HX CYST INCISION AND DRAINAGE Left     spider bite, drained and packed    HX TONSILLECTOMY Bilateral          Family History:   Problem Relation Age of Onset    Diabetes Mother     Asthma Mother     No Known Problems Father        Social History     Socioeconomic History    Marital status: SINGLE     Spouse name: Not on file    Number of children: Not on file    Years of education: Not on file    Highest education level: Not on file   Occupational History    Not on file   Social Needs    Financial resource strain: Not on file    Food insecurity:     Worry: Not on file     Inability: Not on file    Transportation needs:     Medical: Not on file     Non-medical: Not on file   Tobacco Use    Smoking status: Former Smoker     Last attempt to quit: 10/23/2008     Years since quittin.1    Smokeless tobacco: Never Used   Substance and Sexual Activity    Alcohol use:  Yes    Drug use: No    Sexual activity: Yes   Lifestyle    Physical activity:     Days per week: Not on file     Minutes per session: Not on file    Stress: Not on file   Relationships    Social connections:     Talks on phone: Not on file     Gets together: Not on file     Attends [employer] service: Not on file     Active member of club or organization: Not on file     Attends meetings of clubs or organizations: Not on file     Relationship status: Not on file    Intimate partner violence:     Fear of current or ex partner: Not on file     Emotionally abused: Not on file     Physically abused: Not on file     Forced sexual activity: Not on file   Other Topics Concern    Not on file   Social History Narrative    Not on file         ALLERGIES: Patient has no known allergies. Review of Systems   HENT: Positive for sore throat. Respiratory: Positive for cough. Negative for shortness of breath. Cardiovascular: Negative for chest pain. Gastrointestinal: Positive for vomiting (twice from coughing). All other systems reviewed and are negative. Vitals:    12/16/19 0126 12/16/19 0128   BP:  (!) 147/99   Pulse:  70   Resp:  18   Temp:  97.9 °F (36.6 °C)   SpO2:  97%   Weight: 105.3 kg (232 lb 2.3 oz)    Height: 6' 3\" (1.905 m)             Physical Exam  Vitals signs and nursing note reviewed. Constitutional:       General: He is not in acute distress. Appearance: He is well-developed. HENT:      Head: Normocephalic and atraumatic. Eyes:      Conjunctiva/sclera: Conjunctivae normal.   Neck:      Musculoskeletal: Neck supple. Trachea: No tracheal deviation. Cardiovascular:      Rate and Rhythm: Normal rate and regular rhythm. Pulmonary:      Effort: Pulmonary effort is normal. No respiratory distress. Breath sounds: No stridor. Wheezing (faint coarse worse at bases) present. No rhonchi or rales. Abdominal:      General: There is no distension. Musculoskeletal: Normal range of motion. General: No deformity. Skin:     General: Skin is warm and dry. Neurological:      Mental Status: He is alert. Cranial Nerves: No cranial nerve deficit.    Psychiatric:         Behavior: Behavior normal.          MDM     36 y.o. male presents with coarse basilar wheezing and productive cough with no fever or other signs of pneumonia. Has had bronchitis previously and this is similar. Provided albuterol inhaler and spacer for supportive care. Will start an empiric regimen of expectorant, antitussive, and antihistamines. No indication for antibiotics currently. Plan to follow up with PCP as needed and return precautions discussed for worsening or new concerning symptoms.      Procedures

## 2019-12-16 NOTE — ED TRIAGE NOTES
Patient arriving with c/o cough x 2 days using a cough suppressant without relief. Patient denies fevers.

## 2020-02-12 ENCOUNTER — HOSPITAL ENCOUNTER (EMERGENCY)
Age: 35
Discharge: HOME | End: 2020-02-12
Attending: EMERGENCY MEDICINE
Payer: COMMERCIAL

## 2020-02-12 VITALS
WEIGHT: 235.67 LBS | HEART RATE: 62 BPM | HEIGHT: 75 IN | TEMPERATURE: 98 F | BODY MASS INDEX: 29.3 KG/M2 | RESPIRATION RATE: 16 BRPM | OXYGEN SATURATION: 97 % | DIASTOLIC BLOOD PRESSURE: 94 MMHG | SYSTOLIC BLOOD PRESSURE: 137 MMHG
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
## 2020-02-12 DIAGNOSIS — T07.XXXA ABRASIONS OF MULTIPLE SITES: ICD-10-CM

## 2020-02-12 DIAGNOSIS — T07.XXXA MULTIPLE CONTUSIONS: ICD-10-CM

## 2020-02-12 PROCEDURE — 74011250637 HC RX REV CODE- 250/637: Performed by: EMERGENCY MEDICINE

## 2020-02-12 PROCEDURE — 99283 EMERGENCY DEPT VISIT LOW MDM: CPT

## 2020-02-12 PROCEDURE — 74011000250 HC RX REV CODE- 250: Performed by: EMERGENCY MEDICINE

## 2020-02-12 RX ORDER — IBUPROFEN 400 MG/1
800 TABLET ORAL
Status: COMPLETED | OUTPATIENT
Start: 2020-02-12 | End: 2020-02-12

## 2020-02-12 RX ORDER — CYCLOBENZAPRINE HCL 10 MG
10 TABLET ORAL 2 TIMES DAILY
Qty: 20 TAB | Refills: 0 | Status: SHIPPED | OUTPATIENT
Start: 2020-02-12

## 2020-02-12 RX ORDER — IBUPROFEN 800 MG/1
800 TABLET ORAL
Qty: 30 TAB | Refills: 0 | Status: SHIPPED | OUTPATIENT
Start: 2020-02-12

## 2020-02-12 RX ORDER — BACITRACIN 500 UNIT/G
1 PACKET (EA) TOPICAL
Status: COMPLETED | OUTPATIENT
Start: 2020-02-12 | End: 2020-02-12

## 2020-02-12 RX ADMIN — IBUPROFEN 800 MG: 400 TABLET, FILM COATED ORAL at 22:59

## 2020-02-12 RX ADMIN — BACITRACIN 1 PACKET: 500 OINTMENT TOPICAL at 22:59

## 2020-02-12 NOTE — LETTER
[address] 
[facility] 
[address] [address] [phone] Work/School Note Date: 2/12/2020 To Whom It May concern: Jeovanny Kendall was seen and treated today in the emergency room by the following provider(s): 
Attending Provider: Polo Ricardo MD. Jeovanny Kendall may return to work on 02/15/2020. Sincerely, Melba Villeda MD
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
## 2022-03-03 ENCOUNTER — HOSPITAL ENCOUNTER (EMERGENCY)
Age: 37
Discharge: HOME | End: 2022-03-03
Attending: EMERGENCY MEDICINE
Payer: COMMERCIAL

## 2022-03-03 VITALS
DIASTOLIC BLOOD PRESSURE: 97 MMHG | WEIGHT: 246.03 LBS | TEMPERATURE: 98.2 F | SYSTOLIC BLOOD PRESSURE: 139 MMHG | OXYGEN SATURATION: 95 % | HEART RATE: 72 BPM | RESPIRATION RATE: 16 BRPM | BODY MASS INDEX: 30.59 KG/M2 | HEIGHT: 75 IN

## 2022-03-03 DIAGNOSIS — S61.209A AVULSION, FINGER TIP, INITIAL ENCOUNTER: Primary | ICD-10-CM

## 2022-03-03 PROCEDURE — 90715 TDAP VACCINE 7 YRS/> IM: CPT | Performed by: FAMILY MEDICINE

## 2022-03-03 PROCEDURE — 99284 EMERGENCY DEPT VISIT MOD MDM: CPT

## 2022-03-03 PROCEDURE — 74011250636 HC RX REV CODE- 250/636: Performed by: FAMILY MEDICINE

## 2022-03-03 PROCEDURE — 90471 IMMUNIZATION ADMIN: CPT

## 2022-03-03 RX ORDER — CEPHALEXIN 500 MG/1
500 CAPSULE ORAL 4 TIMES DAILY
Qty: 28 CAPSULE | Refills: 0 | Status: SHIPPED | OUTPATIENT
Start: 2022-03-03 | End: 2022-03-10

## 2022-03-03 RX ADMIN — TETANUS TOXOID, REDUCED DIPHTHERIA TOXOID AND ACELLULAR PERTUSSIS VACCINE, ADSORBED 0.5 ML: 5; 2.5; 8; 8; 2.5 SUSPENSION INTRAMUSCULAR at 12:34

## 2022-03-03 NOTE — ED NOTES
Discharge instructions reviewed with pt and copy given along with RX by this RN. Patient verbalized understanding of all education and is ambulatory from ED in no sign of distress or discomfort.

## 2022-03-03 NOTE — ED PROVIDER NOTES
Patient is a 60-year-old male with no past medical history who presents with avulsion injury to the tip of his left second finger. He reports this occurred at work approximately 1 hour ago any reports he accidentally cut himself with a . He noted that there was a lot of bleeding to the area, so he wrapped up his finger and came in for further evaluation. He reports his pain is minimal.  He has full sensation in his finger, he has full range of motion. He is unsure when his last tetanus shot was. No past medical history on file. Past Surgical History:   Procedure Laterality Date    HX CYST INCISION AND DRAINAGE Left     spider bite, drained and packed    HX TONSILLECTOMY Bilateral          Family History:   Problem Relation Age of Onset    Diabetes Mother     Asthma Mother     No Known Problems Father        Social History     Socioeconomic History    Marital status: SINGLE     Spouse name: Not on file    Number of children: Not on file    Years of education: Not on file    Highest education level: Not on file   Occupational History    Not on file   Tobacco Use    Smoking status: Former Smoker     Quit date: 10/23/2008     Years since quittin.3    Smokeless tobacco: Never Used   Substance and Sexual Activity    Alcohol use: Yes    Drug use: No    Sexual activity: Yes   Other Topics Concern    Not on file   Social History Narrative    Not on file     Social Determinants of Health     Financial Resource Strain:     Difficulty of Paying Living Expenses: Not on file   Food Insecurity:     Worried About Running Out of Food in the Last Year: Not on file    Scott of Food in the Last Year: Not on file   Transportation Needs:     Lack of Transportation (Medical): Not on file    Lack of Transportation (Non-Medical):  Not on file   Physical Activity:     Days of Exercise per Week: Not on file    Minutes of Exercise per Session: Not on file   Stress:     Feeling of Stress : Not on file   Social Connections:     Frequency of Communication with Friends and Family: Not on file    Frequency of Social Gatherings with Friends and Family: Not on file    Attends Protestant Services: Not on file    Active Member of Clubs or Organizations: Not on file    Attends Club or Organization Meetings: Not on file    Marital Status: Not on file   Intimate Partner Violence:     Fear of Current or Ex-Partner: Not on file    Emotionally Abused: Not on file    Physically Abused: Not on file    Sexually Abused: Not on file   Housing Stability:     Unable to Pay for Housing in the Last Year: Not on file    Number of [place] in the Last Year: Not on file    Unstable Housing in the Last Year: Not on file         ALLERGIES: Patient has no known allergies. Review of Systems   Constitutional: Negative for unexpected weight change. HENT: Negative for congestion. Eyes: Negative for visual disturbance. Respiratory: Negative for cough, chest tightness and shortness of breath. Cardiovascular: Negative for chest pain. Gastrointestinal: Negative for abdominal pain. Endocrine: Negative for polyuria. Genitourinary: Negative for dysuria. Musculoskeletal: Negative for back pain. Skin: Negative for color change. Allergic/Immunologic: Negative for immunocompromised state. Neurological: Negative for dizziness and headaches. Hematological: Negative for adenopathy. Psychiatric/Behavioral: Negative for agitation. Vitals:    03/03/22 1227   BP: (!) 124/93   Pulse: 72   Resp: 16   Temp: 98.2 °F (36.8 °C)   SpO2: 96%   Weight: 111.6 kg (246 lb 0.5 oz)   Height: 6' 3\" (1.905 m)            Physical Exam  Vitals and nursing note reviewed. Constitutional:       Appearance: Normal appearance. He is normal weight. Eyes:      Conjunctiva/sclera: Conjunctivae normal.      Pupils: Pupils are equal, round, and reactive to light.    Cardiovascular:      Rate and Rhythm: Normal rate.      Pulses: Normal pulses. Pulmonary:      Effort: Pulmonary effort is normal.   Abdominal:      General: Abdomen is flat. Musculoskeletal:         General: Normal range of motion. Hands:       Cervical back: Neck supple. Skin:     General: Skin is warm. Capillary Refill: Capillary refill takes less than 2 seconds. Neurological:      General: No focal deficit present. Mental Status: He is alert and oriented to person, place, and time. Mental status is at baseline. Psychiatric:         Mood and Affect: Mood normal.         Behavior: Behavior normal.          MDM  Number of Diagnoses or Management Options  Avulsion, finger tip, initial encounter  Diagnosis management comments: Patient presents with avulsion injury to finger. Will have patient apply pressure to finger. Wound bed cleaned out with sterile water. Lido with epi applied to decrease bleeding. Layer of dermabond placed. Wound wrapped with nonadherent pressure dressing. Will update patient's tetanus. Advised patient to follow up with his PCP in 1 week for wound recheck. Will place patient on oral keflex for prophylaxis. Discussed my clinical impression(s), any labs and/or radiology results with the patient. I answered any questions and addressed any concerns. Discussed the importance of following up with their primary care physician and/or specialist(s). Discussed signs or symptoms that would warrant return back to the ER for further evaluation. The patient is agreeable with discharge.     Patient Progress  Patient progress: stable         Procedures

## 2022-03-03 NOTE — ED TRIAGE NOTES
States 2 hours ago cut tip of left pointer finger on a . States has not been able to control the bleeding at home. Unknown when last tetanus shot.

## 2022-03-03 NOTE — DISCHARGE INSTRUCTIONS
Thank you for allowing us to provide you with medical care today. We realize that you have many choices for your emergency care needs. We thank you for choosing [facility]. Please choose us in the future for any continued health care needs. The exam and treatment you received in the emergency department were for an emergent problem and are not intended as complete care. It is important that you follow-up with a doctor. If your symptoms worsen or you do not improve should return to the emergency department. We are available 24 hours a day. Please make an appointment with your health care provider for follow-up of your emergency department visit. Take this sheet with you when you go to your follow-up visit.

## 2022-03-19 PROBLEM — R17 ELEVATED BILIRUBIN: Status: ACTIVE | Noted: 2018-04-11

## 2023-05-18 RX ORDER — LORATADINE 10 MG/1
10 TABLET ORAL DAILY
COMMUNITY
Start: 2019-12-16

## 2023-05-18 RX ORDER — IBUPROFEN 800 MG/1
800 TABLET ORAL EVERY 6 HOURS PRN
COMMUNITY
Start: 2020-02-12

## 2023-05-18 RX ORDER — CYCLOBENZAPRINE HCL 10 MG
10 TABLET ORAL 2 TIMES DAILY
COMMUNITY
Start: 2020-02-12

## 2023-05-18 RX ORDER — ASCORBIC ACID 500 MG
TABLET ORAL
COMMUNITY